# Patient Record
Sex: FEMALE | Race: WHITE | NOT HISPANIC OR LATINO | Employment: FULL TIME | URBAN - METROPOLITAN AREA
[De-identification: names, ages, dates, MRNs, and addresses within clinical notes are randomized per-mention and may not be internally consistent; named-entity substitution may affect disease eponyms.]

---

## 2021-02-10 ENCOUNTER — HOSPITAL ENCOUNTER (EMERGENCY)
Facility: HOSPITAL | Age: 41
Discharge: HOME | End: 2021-02-10
Attending: EMERGENCY MEDICINE
Payer: COMMERCIAL

## 2021-02-10 VITALS
DIASTOLIC BLOOD PRESSURE: 63 MMHG | OXYGEN SATURATION: 99 % | TEMPERATURE: 98.3 F | WEIGHT: 200 LBS | SYSTOLIC BLOOD PRESSURE: 116 MMHG | HEART RATE: 89 BPM | RESPIRATION RATE: 20 BRPM

## 2021-02-10 DIAGNOSIS — F32.A DEPRESSION, UNSPECIFIED DEPRESSION TYPE: Primary | ICD-10-CM

## 2021-02-10 PROCEDURE — 99281 EMR DPT VST MAYX REQ PHY/QHP: CPT

## 2021-02-10 PROCEDURE — 99284 EMERGENCY DEPT VISIT MOD MDM: CPT

## 2021-02-10 RX ORDER — FLUOXETINE HYDROCHLORIDE 60 MG/1
60 TABLET, FILM COATED ORAL; ORAL
COMMUNITY
Start: 2021-01-26 | End: 2023-02-02 | Stop reason: SDUPTHER

## 2021-02-10 RX ORDER — DEXTROAMPHETAMINE SACCHARATE, AMPHETAMINE ASPARTATE MONOHYDRATE, DEXTROAMPHETAMINE SULFATE AND AMPHETAMINE SULFATE 6.25; 6.25; 6.25; 6.25 MG/1; MG/1; MG/1; MG/1
CAPSULE, EXTENDED RELEASE ORAL
COMMUNITY
Start: 2021-01-19 | End: 2023-02-14

## 2021-02-10 RX ORDER — CLONAZEPAM 0.5 MG/1
TABLET ORAL
COMMUNITY
Start: 2021-01-26 | End: 2023-02-14

## 2021-02-10 RX ORDER — CETIRIZINE HYDROCHLORIDE 10 MG/1
10 TABLET ORAL
COMMUNITY
Start: 2021-01-08 | End: 2024-09-27 | Stop reason: ALTCHOICE

## 2021-02-10 RX ORDER — NORETHINDRONE ACETATE AND ETHINYL ESTRADIOL, AND FERROUS FUMARATE 1.5-30(21)
1 KIT ORAL
COMMUNITY
Start: 2021-01-16 | End: 2023-02-14

## 2021-02-11 SDOH — ECONOMIC STABILITY: FOOD INSECURITY: WITHIN THE PAST 12 MONTHS, THE FOOD YOU BOUGHT JUST DIDN'T LAST AND YOU DIDN'T HAVE MONEY TO GET MORE.: NEVER TRUE

## 2021-02-11 SDOH — HEALTH STABILITY: MENTAL HEALTH: HOW OFTEN DO YOU HAVE A DRINK CONTAINING ALCOHOL?: NEVER

## 2021-02-11 SDOH — HEALTH STABILITY: MENTAL HEALTH: HOW OFTEN DO YOU HAVE SIX OR MORE DRINKS ON ONE OCCASION?: NEVER

## 2021-02-11 SDOH — ECONOMIC STABILITY: FOOD INSECURITY: WITHIN THE PAST 12 MONTHS, YOU WORRIED THAT YOUR FOOD WOULD RUN OUT BEFORE YOU GOT THE MONEY TO BUY MORE.: NEVER TRUE

## 2021-02-11 SDOH — ECONOMIC STABILITY: FOOD INSECURITY: HOW HARD IS IT FOR YOU TO PAY FOR THE VERY BASICS LIKE FOOD, HOUSING, MEDICAL CARE, AND HEATING?: NOT HARD AT ALL

## 2021-02-11 SDOH — ECONOMIC STABILITY: TRANSPORTATION INSECURITY: IN THE PAST 12 MONTHS, HAS LACK OF TRANSPORTATION KEPT YOU FROM MEDICAL APPOINTMENTS OR FROM GETTING MEDICATIONS?: NO

## 2021-02-11 ASSESSMENT — COGNITIVE AND FUNCTIONAL STATUS - GENERAL
MOOD: DEPRESSED;HELPLESS
APPEARANCE: WELL GROOMED
JUDGEMENT: IMPAIRED, MINIMALLY
SPEECH: REGULAR
AFFECT: TEARFUL
PSYCHOMOTOR FUNCTIONING: WNL
PERCEPTUAL FUNCTION: NORMAL
THOUGHT_CONTENT: APPROPRIATE
INSIGHT: IMPAIRED, MINIMALLY
IMPULSE CONTROL: NORMAL
ORIENTATION: FULLY ORIENTED

## 2021-02-11 ASSESSMENT — ENCOUNTER SYMPTOMS
HALLUCINATIONS: 0
NUMBNESS: 0
WEAKNESS: 0
NERVOUS/ANXIOUS: 1
COUGH: 0
HEADACHES: 0
VOMITING: 0
NECK STIFFNESS: 0
SHORTNESS OF BREATH: 0
NECK PAIN: 0
FEVER: 0
DIARRHEA: 0
ABDOMINAL PAIN: 0
COLOR CHANGE: 0

## 2021-02-11 ASSESSMENT — ACTIVITIES OF DAILY LIVING (ADL): LACK_OF_TRANSPORTATION: NO
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
## 2021-02-11 NOTE — DISCHARGE INSTRUCTIONS
Follow-up with the resources provided to you by our   Continue taking your Prozac as prescribed    Return to the ER if any increase depression, suicidal thoughts, homicidal thoughts, hallucinations, chest pain, shortness of breath, thoughts of harming yourself, any concerns

## 2023-03-28 ENCOUNTER — APPOINTMENT (RX ONLY)
Dept: URBAN - METROPOLITAN AREA CLINIC 374 | Facility: CLINIC | Age: 43
Setting detail: DERMATOLOGY
End: 2023-03-28

## 2023-03-28 DIAGNOSIS — D22 MELANOCYTIC NEVI: ICD-10-CM

## 2023-03-28 DIAGNOSIS — Z71.89 OTHER SPECIFIED COUNSELING: ICD-10-CM

## 2023-03-28 DIAGNOSIS — D18.0 HEMANGIOMA: ICD-10-CM

## 2023-03-28 DIAGNOSIS — D485 NEOPLASM OF UNCERTAIN BEHAVIOR OF SKIN: ICD-10-CM

## 2023-03-28 DIAGNOSIS — L82.0 INFLAMED SEBORRHEIC KERATOSIS: ICD-10-CM

## 2023-03-28 DIAGNOSIS — L81.4 OTHER MELANIN HYPERPIGMENTATION: ICD-10-CM

## 2023-03-28 PROBLEM — D22.61 MELANOCYTIC NEVI OF RIGHT UPPER LIMB, INCLUDING SHOULDER: Status: ACTIVE | Noted: 2023-03-28

## 2023-03-28 PROBLEM — D48.5 NEOPLASM OF UNCERTAIN BEHAVIOR OF SKIN: Status: ACTIVE | Noted: 2023-03-28

## 2023-03-28 PROBLEM — D22.71 MELANOCYTIC NEVI OF RIGHT LOWER LIMB, INCLUDING HIP: Status: ACTIVE | Noted: 2023-03-28

## 2023-03-28 PROBLEM — D22.5 MELANOCYTIC NEVI OF TRUNK: Status: ACTIVE | Noted: 2023-03-28

## 2023-03-28 PROBLEM — D22.72 MELANOCYTIC NEVI OF LEFT LOWER LIMB, INCLUDING HIP: Status: ACTIVE | Noted: 2023-03-28

## 2023-03-28 PROBLEM — D18.01 HEMANGIOMA OF SKIN AND SUBCUTANEOUS TISSUE: Status: ACTIVE | Noted: 2023-03-28

## 2023-03-28 PROBLEM — D22.62 MELANOCYTIC NEVI OF LEFT UPPER LIMB, INCLUDING SHOULDER: Status: ACTIVE | Noted: 2023-03-28

## 2023-03-28 PROCEDURE — ? COUNSELING

## 2023-03-28 PROCEDURE — ? SUNSCREEN RECOMMENDATIONS

## 2023-03-28 PROCEDURE — ? PHOTO-DOCUMENTATION

## 2023-03-28 PROCEDURE — ? FULL BODY SKIN EXAM

## 2023-03-28 PROCEDURE — 99203 OFFICE O/P NEW LOW 30 MIN: CPT

## 2023-03-28 PROCEDURE — ? DEFER

## 2023-03-28 ASSESSMENT — LOCATION DETAILED DESCRIPTION DERM
LOCATION DETAILED: RIGHT PROXIMAL PRETIBIAL REGION
LOCATION DETAILED: RIGHT PROXIMAL POSTERIOR UPPER ARM
LOCATION DETAILED: RIGHT SUPERIOR MEDIAL UPPER BACK
LOCATION DETAILED: LEFT PROXIMAL PRETIBIAL REGION
LOCATION DETAILED: RIGHT SUPERIOR UPPER BACK
LOCATION DETAILED: EPIGASTRIC SKIN
LOCATION DETAILED: SUPERIOR THORACIC SPINE
LOCATION DETAILED: LEFT MEDIAL SUPERIOR CHEST
LOCATION DETAILED: LEFT PROXIMAL POSTERIOR UPPER ARM

## 2023-03-28 ASSESSMENT — LOCATION SIMPLE DESCRIPTION DERM
LOCATION SIMPLE: RIGHT POSTERIOR UPPER ARM
LOCATION SIMPLE: LEFT POSTERIOR UPPER ARM
LOCATION SIMPLE: UPPER BACK
LOCATION SIMPLE: RIGHT PRETIBIAL REGION
LOCATION SIMPLE: RIGHT UPPER BACK
LOCATION SIMPLE: LEFT PRETIBIAL REGION
LOCATION SIMPLE: ABDOMEN
LOCATION SIMPLE: CHEST

## 2023-03-28 ASSESSMENT — LOCATION ZONE DERM
LOCATION ZONE: ARM
LOCATION ZONE: LEG
LOCATION ZONE: TRUNK

## 2023-03-28 NOTE — PROCEDURE: DEFER
Procedure To Be Performed At Next Visit: Biopsy by shave method
Size Of Lesion In Cm (Optional): 0
Detail Level: Zone
Introduction Text (Please End With A Colon): The following was deferred:
Procedure To Be Performed At Next Visit: Shave Removal

## 2023-05-11 ENCOUNTER — APPOINTMENT (RX ONLY)
Dept: URBAN - METROPOLITAN AREA CLINIC 374 | Facility: CLINIC | Age: 43
Setting detail: DERMATOLOGY
End: 2023-05-11

## 2023-05-11 DIAGNOSIS — D485 NEOPLASM OF UNCERTAIN BEHAVIOR OF SKIN: ICD-10-CM

## 2023-05-11 DIAGNOSIS — L82.0 INFLAMED SEBORRHEIC KERATOSIS: ICD-10-CM

## 2023-05-11 PROBLEM — D48.5 NEOPLASM OF UNCERTAIN BEHAVIOR OF SKIN: Status: ACTIVE | Noted: 2023-05-11

## 2023-05-11 PROCEDURE — ? SHAVE REMOVAL

## 2023-05-11 PROCEDURE — 11300 SHAVE SKIN LESION 0.5 CM/<: CPT

## 2023-05-11 PROCEDURE — ? PHOTO-DOCUMENTATION

## 2023-05-11 PROCEDURE — 11301 SHAVE SKIN LESION 0.6-1.0 CM: CPT

## 2023-05-11 ASSESSMENT — LOCATION DETAILED DESCRIPTION DERM
LOCATION DETAILED: RIGHT SUPERIOR UPPER BACK
LOCATION DETAILED: LEFT MEDIAL SUPERIOR CHEST

## 2023-05-11 ASSESSMENT — LOCATION SIMPLE DESCRIPTION DERM
LOCATION SIMPLE: CHEST
LOCATION SIMPLE: RIGHT UPPER BACK

## 2023-05-11 ASSESSMENT — LOCATION ZONE DERM: LOCATION ZONE: TRUNK

## 2023-05-11 NOTE — PROCEDURE: SHAVE REMOVAL
Medical Necessity Information: It is in your best interest to select a reason for this procedure from the list below. All of these items fulfill various CMS LCD requirements except the new and changing color options.
Medical Necessity Clause: This procedure was medically necessary because the lesion that was treated was:
Lab: 6
Lab Facility: 0
Detail Level: Detailed
Was A Bandage Applied: Yes
Size Of Lesion In Cm (Required): 0.7
Depth Of Shave: dermis
Biopsy Method: Dermablade
Anesthesia Type: 1% lidocaine with epinephrine
Hemostasis: Aluminum Chloride and Electrocautery
Wound Care: Vaseline
Path Notes (To The Dermatopathologist): Please checks margins
Render Path Notes In Note?: No
Consent was obtained from the patient. The risks and benefits to therapy were discussed in detail. Specifically, the risks of infection, scarring, bleeding, prolonged wound healing, incomplete removal, allergy to anesthesia, nerve injury and recurrence were addressed. Prior to the procedure, the treatment site was clearly identified and confirmed by the patient. All components of Universal Protocol/PAUSE Rule completed.
Post-Care Instructions: I reviewed with the patient in detail post-care instructions. Patient is to keep the biopsy site dry overnight, and then apply bacitracin twice daily until healed. Patient may apply hydrogen peroxide soaks to remove any crusting.
Notification Instructions: Patient will be notified of pathology results. However, patient instructed to call the office if not contacted within 2 weeks.
Billing Type: Third-Party Bill
Size Of Lesion In Cm (Required): 0.3

## 2024-04-18 ENCOUNTER — APPOINTMENT (RX ONLY)
Dept: URBAN - METROPOLITAN AREA CLINIC 374 | Facility: CLINIC | Age: 44
Setting detail: DERMATOLOGY
End: 2024-04-18

## 2024-04-18 DIAGNOSIS — B07.8 OTHER VIRAL WARTS: ICD-10-CM | Status: INADEQUATELY CONTROLLED

## 2024-04-18 DIAGNOSIS — D22 MELANOCYTIC NEVI: ICD-10-CM

## 2024-04-18 DIAGNOSIS — L82.1 OTHER SEBORRHEIC KERATOSIS: ICD-10-CM

## 2024-04-18 DIAGNOSIS — D18.0 HEMANGIOMA: ICD-10-CM

## 2024-04-18 DIAGNOSIS — D485 NEOPLASM OF UNCERTAIN BEHAVIOR OF SKIN: ICD-10-CM

## 2024-04-18 DIAGNOSIS — L81.4 OTHER MELANIN HYPERPIGMENTATION: ICD-10-CM

## 2024-04-18 DIAGNOSIS — Z71.89 OTHER SPECIFIED COUNSELING: ICD-10-CM

## 2024-04-18 PROBLEM — D22.5 MELANOCYTIC NEVI OF TRUNK: Status: ACTIVE | Noted: 2024-04-18

## 2024-04-18 PROBLEM — D18.01 HEMANGIOMA OF SKIN AND SUBCUTANEOUS TISSUE: Status: ACTIVE | Noted: 2024-04-18

## 2024-04-18 PROBLEM — D23.72 OTHER BENIGN NEOPLASM OF SKIN OF LEFT LOWER LIMB, INCLUDING HIP: Status: ACTIVE | Noted: 2024-04-18

## 2024-04-18 PROBLEM — D48.5 NEOPLASM OF UNCERTAIN BEHAVIOR OF SKIN: Status: ACTIVE | Noted: 2024-04-18

## 2024-04-18 PROCEDURE — ? FULL BODY SKIN EXAM

## 2024-04-18 PROCEDURE — ? TREATMENT REGIMEN

## 2024-04-18 PROCEDURE — 99213 OFFICE O/P EST LOW 20 MIN: CPT

## 2024-04-18 PROCEDURE — ? DEFER

## 2024-04-18 PROCEDURE — ? PHOTO-DOCUMENTATION

## 2024-04-18 PROCEDURE — ? CPT CODE GENERATOR

## 2024-04-18 PROCEDURE — ? COUNSELING

## 2024-04-18 ASSESSMENT — LOCATION DETAILED DESCRIPTION DERM
LOCATION DETAILED: RIGHT INFERIOR MEDIAL UPPER BACK
LOCATION DETAILED: LEFT PROXIMAL POSTERIOR UPPER ARM
LOCATION DETAILED: LEFT ANTERIOR MEDIAL PROXIMAL THIGH
LOCATION DETAILED: UPPER STERNUM
LOCATION DETAILED: RIGHT ANTERIOR SHOULDER
LOCATION DETAILED: RIGHT INFERIOR LATERAL MIDBACK
LOCATION DETAILED: EPIGASTRIC SKIN
LOCATION DETAILED: LEFT CENTRAL MALAR CHEEK
LOCATION DETAILED: INFERIOR THORACIC SPINE
LOCATION DETAILED: RIGHT MEDIAL FOREHEAD

## 2024-04-18 ASSESSMENT — LOCATION SIMPLE DESCRIPTION DERM
LOCATION SIMPLE: RIGHT SHOULDER
LOCATION SIMPLE: LEFT CHEEK
LOCATION SIMPLE: RIGHT LOWER BACK
LOCATION SIMPLE: RIGHT FOREHEAD
LOCATION SIMPLE: UPPER BACK
LOCATION SIMPLE: LEFT POSTERIOR UPPER ARM
LOCATION SIMPLE: CHEST
LOCATION SIMPLE: ABDOMEN
LOCATION SIMPLE: LEFT THIGH
LOCATION SIMPLE: RIGHT UPPER BACK

## 2024-04-18 ASSESSMENT — LOCATION ZONE DERM
LOCATION ZONE: TRUNK
LOCATION ZONE: FACE
LOCATION ZONE: ARM
LOCATION ZONE: LEG

## 2024-04-18 NOTE — PROCEDURE: DEFER
X Size Of Lesion In Cm (Optional): 0.3
Procedure To Be Performed At Next Visit: Biopsy by shave method
Introduction Text (Please End With A Colon): The following procedure was deferred:
Detail Level: Detailed
Size Of Lesion In Cm (Optional): 0.4
X Size Of Lesion In Cm (Optional): 0.2
X Size Of Lesion In Cm (Optional): 0
Procedure To Be Performed At Next Visit: Liquid nitrogen

## 2024-04-18 NOTE — PROCEDURE: CPT CODE GENERATOR
29613 Price: 0.00
Show Pathology Codes?: No
First Procedure You Would Like A Quote For?: Benign Destruction
Anticipated Depth And Size Of Soft Tissue Excision (Required): Subcutaneous, Less than 2 cm
First Biopsy Type: Tangential Biopsy
How Many Lesions Are You Destroying?: 1
Number Of Lesions Injected: Less than 8 lesions
Anticipated Depth And Size Of Soft Tissue Excision (Required): Subcutaneous, Less than 1.5 cm
Fee Schedule Discount In % Off Of Fee Schedule: Standard Fee Schedule as Entered in Pricing Tab
How Many Lesions Are You Destroying?: Less than 15
Fee Schedule Discount For Cash Pay Patients In % Off Of Fee Schedule: 0
Anticipated Depth And Size Of Soft Tissue Excision (Required): Subcutaneous, Less than 3 cm
Which Photosensitizer Was Used?: Levulan
Detail Level: None

## 2024-04-25 ENCOUNTER — APPOINTMENT (RX ONLY)
Dept: URBAN - METROPOLITAN AREA CLINIC 374 | Facility: CLINIC | Age: 44
Setting detail: DERMATOLOGY
End: 2024-04-25

## 2024-04-25 DIAGNOSIS — B07.8 OTHER VIRAL WARTS: ICD-10-CM | Status: INADEQUATELY CONTROLLED

## 2024-04-25 DIAGNOSIS — D485 NEOPLASM OF UNCERTAIN BEHAVIOR OF SKIN: ICD-10-CM

## 2024-04-25 PROBLEM — D48.5 NEOPLASM OF UNCERTAIN BEHAVIOR OF SKIN: Status: ACTIVE | Noted: 2024-04-25

## 2024-04-25 PROCEDURE — ? COUNSELING

## 2024-04-25 PROCEDURE — ? PHOTO-DOCUMENTATION

## 2024-04-25 PROCEDURE — ? LIQUID NITROGEN

## 2024-04-25 PROCEDURE — 11103 TANGNTL BX SKIN EA SEP/ADDL: CPT | Mod: 59

## 2024-04-25 PROCEDURE — 11102 TANGNTL BX SKIN SINGLE LES: CPT | Mod: 59

## 2024-04-25 PROCEDURE — 17110 DESTRUCTION B9 LES UP TO 14: CPT

## 2024-04-25 PROCEDURE — ? BIOPSY BY SHAVE METHOD

## 2024-04-25 ASSESSMENT — LOCATION SIMPLE DESCRIPTION DERM
LOCATION SIMPLE: LEFT THIGH
LOCATION SIMPLE: LEFT POSTERIOR UPPER ARM
LOCATION SIMPLE: RIGHT LOWER BACK

## 2024-04-25 ASSESSMENT — LOCATION ZONE DERM
LOCATION ZONE: ARM
LOCATION ZONE: LEG
LOCATION ZONE: TRUNK

## 2024-04-25 ASSESSMENT — LOCATION DETAILED DESCRIPTION DERM
LOCATION DETAILED: LEFT ANTERIOR PROXIMAL THIGH
LOCATION DETAILED: LEFT PROXIMAL POSTERIOR UPPER ARM
LOCATION DETAILED: RIGHT INFERIOR LATERAL MIDBACK

## 2024-04-25 NOTE — PROCEDURE: BIOPSY BY SHAVE METHOD
Detail Level: Detailed
Depth Of Biopsy: dermis
Was A Bandage Applied: Yes
Size Of Lesion In Cm: 0.4
X Size Of Lesion In Cm: 0.3
Biopsy Type: H and E
Biopsy Method: Dermablade
Anesthesia Type: 1% lidocaine with epinephrine
Anesthesia Volume In Cc: 0.2
Additional Anesthesia Volume In Cc (Will Not Render If 0): 0
Hemostasis: Drysol
Wound Care: Petrolatum
Dressing: bandage
Destruction After The Procedure: No
Type Of Destruction Used: Curettage
Curettage Text: The wound bed was treated with curettage after the biopsy was performed.
Cryotherapy Text: The wound bed was treated with cryotherapy after the biopsy was performed.
Electrodesiccation Text: The wound bed was treated with electrodesiccation after the biopsy was performed.
Electrodesiccation And Curettage Text: The wound bed was treated with electrodesiccation and curettage after the biopsy was performed.
Silver Nitrate Text: The wound bed was treated with silver nitrate after the biopsy was performed.
Lab: 6
Lab Facility: 3
Consent: Written consent was obtained and risks were reviewed including but not limited to scarring, infection, bleeding, scabbing, incomplete removal, nerve damage and allergy to anesthesia.
Post-Care Instructions: I reviewed with the patient in detail post-care instructions. Patient is to keep the biopsy site dry overnight, and then apply bacitracin twice daily until healed. Patient may apply hydrogen peroxide soaks to remove any crusting.
Notification Instructions: Patient will be notified of biopsy results. However, patient instructed to call the office if not contacted within 2 weeks.
Billing Type: Third-Party Bill
Information: Selecting Yes will display possible errors in your note based on the variables you have selected. This validation is only offered as a suggestion for you. PLEASE NOTE THAT THE VALIDATION TEXT WILL BE REMOVED WHEN YOU FINALIZE YOUR NOTE. IF YOU WANT TO FAX A PRELIMINARY NOTE YOU WILL NEED TO TOGGLE THIS TO 'NO' IF YOU DO NOT WANT IT IN YOUR FAXED NOTE.

## 2024-04-25 NOTE — PROCEDURE: LIQUID NITROGEN
Show Topical Anesthesia Variable?: Yes
Include Z78.9 (Other Specified Conditions Influencing Health Status) As An Associated Diagnosis?: No
Detail Level: Detailed
Consent: The patient's consent was obtained including but not limited to risks of crusting, scabbing, blistering, scarring, darker or lighter pigmentary change, recurrence, incomplete removal and infection.
Medical Necessity Clause: This procedure was medically necessary because the lesions that were treated were:
Medical Necessity Information: It is in your best interest to select a reason for this procedure from the list below. All of these items fulfill various CMS LCD requirements except the new and changing color options.
Number Of Freeze-Thaw Cycles: 3 freeze-thaw cycles
Duration Of Freeze Thaw-Cycle (Seconds): 3
Post-Care Instructions: I reviewed with the patient in detail post-care instructions. Patient is to wear sunprotection, and avoid picking at any of the treated lesions. Pt may apply Vaseline to crusted or scabbing areas.
Spray Paint Text: The liquid nitrogen was applied to the skin utilizing a spray paint frosting technique.

## 2024-09-26 ENCOUNTER — OFFICE VISIT (OUTPATIENT)
Dept: SURGERY | Facility: CLINIC | Age: 44
End: 2024-09-26
Payer: COMMERCIAL

## 2024-09-26 ENCOUNTER — HOSPITAL ENCOUNTER (OUTPATIENT)
Dept: CARDIOLOGY | Facility: HOSPITAL | Age: 44
Discharge: HOME | End: 2024-09-26
Attending: SURGERY
Payer: COMMERCIAL

## 2024-09-26 ENCOUNTER — TRANSCRIBE ORDERS (OUTPATIENT)
Dept: SURGERY | Facility: CLINIC | Age: 44
End: 2024-09-26

## 2024-09-26 ENCOUNTER — PREP FOR CASE (OUTPATIENT)
Dept: SURGERY | Facility: CLINIC | Age: 44
End: 2024-09-26

## 2024-09-26 ENCOUNTER — APPOINTMENT (OUTPATIENT)
Dept: LAB | Facility: HOSPITAL | Age: 44
End: 2024-09-26
Attending: SURGERY
Payer: COMMERCIAL

## 2024-09-26 VITALS
OXYGEN SATURATION: 98 % | BODY MASS INDEX: 29.1 KG/M2 | SYSTOLIC BLOOD PRESSURE: 116 MMHG | WEIGHT: 185.4 LBS | TEMPERATURE: 98 F | DIASTOLIC BLOOD PRESSURE: 72 MMHG | HEART RATE: 61 BPM | HEIGHT: 67 IN

## 2024-09-26 DIAGNOSIS — K43.9 VENTRAL HERNIA WITHOUT OBSTRUCTION OR GANGRENE: Primary | ICD-10-CM

## 2024-09-26 DIAGNOSIS — Z01.812 ENCOUNTER FOR PREPROCEDURAL LABORATORY EXAMINATION: Primary | ICD-10-CM

## 2024-09-26 DIAGNOSIS — Z01.812 ENCOUNTER FOR PREPROCEDURAL LABORATORY EXAMINATION: ICD-10-CM

## 2024-09-26 PROBLEM — K43.6 INCARCERATED VENTRAL HERNIA: Status: ACTIVE | Noted: 2024-09-26

## 2024-09-26 LAB
ANION GAP SERPL CALC-SCNC: 5 MEQ/L (ref 3–15)
BASOPHILS # BLD: 0.03 K/UL (ref 0.01–0.1)
BASOPHILS NFR BLD: 0.4 %
BUN SERPL-MCNC: 10 MG/DL (ref 7–25)
CALCIUM SERPL-MCNC: 9.5 MG/DL (ref 8.6–10.3)
CHLORIDE SERPL-SCNC: 101 MEQ/L (ref 98–107)
CO2 SERPL-SCNC: 34 MEQ/L (ref 21–31)
CREAT SERPL-MCNC: 0.9 MG/DL (ref 0.6–1.2)
DIFFERENTIAL METHOD BLD: NORMAL
EGFRCR SERPLBLD CKD-EPI 2021: >60 ML/MIN/1.73M*2
EOSINOPHIL # BLD: 0.16 K/UL (ref 0.04–0.36)
EOSINOPHIL NFR BLD: 2.4 %
ERYTHROCYTE [DISTWIDTH] IN BLOOD BY AUTOMATED COUNT: 12.7 % (ref 11.7–14.4)
GLUCOSE SERPL-MCNC: 94 MG/DL (ref 70–99)
HCT VFR BLD AUTO: 41.1 % (ref 35–45)
HGB BLD-MCNC: 13.3 G/DL (ref 11.8–15.7)
IMM GRANULOCYTES # BLD AUTO: 0.01 K/UL (ref 0–0.08)
IMM GRANULOCYTES NFR BLD AUTO: 0.1 %
LYMPHOCYTES # BLD: 2.09 K/UL (ref 1.2–3.5)
LYMPHOCYTES NFR BLD: 31.2 %
MCH RBC QN AUTO: 28.9 PG (ref 28–33.2)
MCHC RBC AUTO-ENTMCNC: 32.4 G/DL (ref 32.2–35.5)
MCV RBC AUTO: 89.2 FL (ref 83–98)
MONOCYTES # BLD: 0.65 K/UL (ref 0.28–0.8)
MONOCYTES NFR BLD: 9.7 %
NEUTROPHILS # BLD: 3.76 K/UL (ref 1.7–7)
NEUTS SEG NFR BLD: 56.2 %
NRBC BLD-RTO: 0 %
PDW BLD AUTO: 11.1 FL (ref 9.4–12.3)
PLATELET # BLD AUTO: 286 K/UL (ref 150–369)
POTASSIUM SERPL-SCNC: 5.1 MEQ/L (ref 3.5–5.1)
RBC # BLD AUTO: 4.61 M/UL (ref 3.93–5.22)
SODIUM SERPL-SCNC: 140 MEQ/L (ref 136–145)
WBC # BLD AUTO: 6.7 K/UL (ref 3.8–10.5)

## 2024-09-26 PROCEDURE — 80048 BASIC METABOLIC PNL TOTAL CA: CPT

## 2024-09-26 PROCEDURE — 85025 COMPLETE CBC W/AUTO DIFF WBC: CPT

## 2024-09-26 PROCEDURE — 36415 COLL VENOUS BLD VENIPUNCTURE: CPT

## 2024-09-26 PROCEDURE — 3008F BODY MASS INDEX DOCD: CPT | Performed by: SURGERY

## 2024-09-26 PROCEDURE — 93005 ELECTROCARDIOGRAM TRACING: CPT

## 2024-09-26 PROCEDURE — 99204 OFFICE O/P NEW MOD 45 MIN: CPT | Performed by: SURGERY

## 2024-09-26 RX ORDER — MINERAL OIL
180 ENEMA (ML) RECTAL EVERY MORNING
COMMUNITY

## 2024-09-26 NOTE — PROGRESS NOTES
[facility]    Dr. Sandor Chung   General Surgery Consult       Patient: Kymberly Grajeda    1980    Visit Date: 09/26/2024    Encounter Provider: Sheldon Patten    Referring Provider: Scheier, Jacqueline, DO    Reason for Visit:  New Patient (Pt is here for a umbilical hernia evaluation. Pt had a previous Ctscan done on 9/14/2021. Pt has a pulling feeling when she moves certain ways. )      Kymberly Grajeda is a 44 y.o. female who was seen for a consultation at the request of referring physician for evaluation of a ventral abdominal wall hernia. Started with symptoms a few years ago and was referred for evaluation due to increasing pain and swelling. CT had in 2021 and showed increased in size since the last CT, since then feels it is getting bigger and now she can not push it back the same way she used to.     Past Medical History:   Diagnosis Date    ADD (attention deficit disorder)     Anxiety     Depression        No past surgical history on file.    Social History     Tobacco Use    Smoking status: Every Day     Current packs/day: 1.00     Types: Cigarettes    Smokeless tobacco: Never   Vaping Use    Vaping status: Never Used   Substance Use Topics    Alcohol use: Never    Drug use: Not Currently     Comment: using more adderall and klonopin than rx       Family History   Problem Relation Name Age of Onset    No Known Problems Biological Mother      Heart attack Biological Father      No Known Problems Biological Sister      No Known Problems Biological Sister         Patient has no known allergies.    Current Medications:  Current Outpatient Medications   Medication Instructions    buPROPion XL (WELLBUTRIN XL) 300 mg, oral, Daily    cetirizine (ZYRTEC) 10 mg, Daily (8a)    fexofenadine (ALLEGRA) 180 mg, Daily    FLUoxetine (PROZAC) 20 mg, oral, Daily    FLUoxetine (PROZAC) 20 mg, oral, Daily    furosemide (LASIX) 40 mg tablet   See Instructions, TAKE 1  TABLET BY MOUTH EVERY DAY, # 90 tab, 1 Refill(s), Prescription Routing: Route to Pharmacy Electronically, Pharmacy: [id number], 171, 03/25/22 9:22:00 EDT, Height, cm, 90.9, 01/01/22 18:39:00 EST, Dosing Weight, kg       Review of Systems    General: In no acute distress  Hematological and Lymphatic ROS: negative, no lymphadenopathy  Respiratory ROS: no cough, shortness of breath, or wheezing  Gastrointestinal ROS: (+) ventral hernia and swelling w pain, no change in bowel habits, or black or bloody stools, no jaundice  Musculoskeletal ROS: negative for - joint pain, joint stiffness, joint swelling, or muscle pain  Neurological ROS: negative for - behavioral changes, headaches, memory loss, or seizures  Extremities: No swelling  Head and Neck: No difficulty swallowing or lymphadenopathy      Physical Exam    General: In no acute distress  Head and Neck: No lymphadenopathy, thyroid normal  Respiratory: Lungs clear to auscultation bilaterally, no rhonchi   Gastroenterology: (+) non reducible ventral hernia no skin changes    Extremities: warm no swelling or erythema  Heart: No murmurs, RRR  Endocrine: No groin or axillary lymphadenopathy  Musculoskeletal: Normal sensation all extremities, no motor dysfunction  Back: Negative for TTP or mass       A/Plan:     Today I had a john discussion with the patient about the meaning of the diagnosis and available treatment options.    The patient suffers from an incarcerated ventral hernia and based on the symptomatic nature of the hernia and incarcerated nature I do recommend to proceed with a robotic possible laparoscopic possible open repair.  We discussed the risk and benefits of procedure which include but not limited to bleeding infection seroma need for additional procedures failure of symptoms to resolve chronic pain and the need for additional surgeries or even bowel injury.  The patient is very grateful and agrees to proceed.  All questions were  answered.    Sheldon Patten MD, FACS  General and Endocrine Surgery  [facility]       09/26/2024

## 2024-09-26 NOTE — H&P (VIEW-ONLY)
[facility]    Dr. Sandor Chung   General Surgery Consult       Patient: Kymberly Grajeda    1980    Visit Date: 09/26/2024    Encounter Provider: Sheldon Patten    Referring Provider: Scheier, Jacqueline, DO    Reason for Visit:  New Patient (Pt is here for a umbilical hernia evaluation. Pt had a previous Ctscan done on 9/14/2021. Pt has a pulling feeling when she moves certain ways. )      Kymberly Grajeda is a 44 y.o. female who was seen for a consultation at the request of referring physician for evaluation of a ventral abdominal wall hernia. Started with symptoms a few years ago and was referred for evaluation due to increasing pain and swelling. CT had in 2021 and showed increased in size since the last CT, since then feels it is getting bigger and now she can not push it back the same way she used to.     Past Medical History:   Diagnosis Date    ADD (attention deficit disorder)     Anxiety     Depression        No past surgical history on file.    Social History     Tobacco Use    Smoking status: Every Day     Current packs/day: 1.00     Types: Cigarettes    Smokeless tobacco: Never   Vaping Use    Vaping status: Never Used   Substance Use Topics    Alcohol use: Never    Drug use: Not Currently     Comment: using more adderall and klonopin than rx       Family History   Problem Relation Name Age of Onset    No Known Problems Biological Mother      Heart attack Biological Father      No Known Problems Biological Sister      No Known Problems Biological Sister         Patient has no known allergies.    Current Medications:  Current Outpatient Medications   Medication Instructions    buPROPion XL (WELLBUTRIN XL) 300 mg, oral, Daily    cetirizine (ZYRTEC) 10 mg, Daily (8a)    fexofenadine (ALLEGRA) 180 mg, Daily    FLUoxetine (PROZAC) 20 mg, oral, Daily    FLUoxetine (PROZAC) 20 mg, oral, Daily    furosemide (LASIX) 40 mg tablet   See Instructions, TAKE 1  TABLET BY MOUTH EVERY DAY, # 90 tab, 1 Refill(s), Prescription Routing: Route to Pharmacy Electronically, Pharmacy: [id number], 171, 03/25/22 9:22:00 EDT, Height, cm, 90.9, 01/01/22 18:39:00 EST, Dosing Weight, kg       Review of Systems    General: In no acute distress  Hematological and Lymphatic ROS: negative, no lymphadenopathy  Respiratory ROS: no cough, shortness of breath, or wheezing  Gastrointestinal ROS: (+) ventral hernia and swelling w pain, no change in bowel habits, or black or bloody stools, no jaundice  Musculoskeletal ROS: negative for - joint pain, joint stiffness, joint swelling, or muscle pain  Neurological ROS: negative for - behavioral changes, headaches, memory loss, or seizures  Extremities: No swelling  Head and Neck: No difficulty swallowing or lymphadenopathy      Physical Exam    General: In no acute distress  Head and Neck: No lymphadenopathy, thyroid normal  Respiratory: Lungs clear to auscultation bilaterally, no rhonchi   Gastroenterology: (+) non reducible ventral hernia no skin changes    Extremities: warm no swelling or erythema  Heart: No murmurs, RRR  Endocrine: No groin or axillary lymphadenopathy  Musculoskeletal: Normal sensation all extremities, no motor dysfunction  Back: Negative for TTP or mass       A/Plan:     Today I had a john discussion with the patient about the meaning of the diagnosis and available treatment options.    The patient suffers from an incarcerated ventral hernia and based on the symptomatic nature of the hernia and incarcerated nature I do recommend to proceed with a robotic possible laparoscopic possible open repair.  We discussed the risk and benefits of procedure which include but not limited to bleeding infection seroma need for additional procedures failure of symptoms to resolve chronic pain and the need for additional surgeries or even bowel injury.  The patient is very grateful and agrees to proceed.  All questions were  answered.    Sheldon Patten MD, FACS  General and Endocrine Surgery  [facility]       09/26/2024

## 2024-09-27 ENCOUNTER — PRE-ADMISSION TESTING (OUTPATIENT)
Dept: PREADMISSION TESTING | Age: 44
End: 2024-09-27
Payer: COMMERCIAL

## 2024-09-27 VITALS — WEIGHT: 185 LBS | BODY MASS INDEX: 29.03 KG/M2 | HEIGHT: 67 IN

## 2024-09-27 LAB
ATRIAL RATE: 65
P AXIS: 4
PR INTERVAL: 144
QRS DURATION: 100
QT INTERVAL: 430
QTC CALCULATION(BAZETT): 447
R AXIS: 63
T WAVE AXIS: 53
VENTRICULAR RATE: 65

## 2024-09-27 RX ORDER — GABAPENTIN 600 MG/1
600 TABLET ORAL AS NEEDED
COMMUNITY
Start: 2023-02-21

## 2024-09-27 ASSESSMENT — PAIN SCALES - GENERAL: PAINLEVEL_OUTOF10: 0-NO PAIN

## 2024-09-27 NOTE — PRE-PROCEDURE INSTRUCTIONS
[address] [address]    1.       You will be called between 1pm-5pm one business day before your procedure to tell you where and when to report. If you do not receive a phone call by 6pm, please call the Operating Room at [phone].    2.        Please report to Surgery Registration on the day of your procedure. You can park in the east garage, enter the front doors of the new Pavilion, and take the [place] elevators to the second floor. Follow signs to Surgery Registration.       3. Please follow the following fasting guidelines:     No solid food EIGHT HOURS prior to arrival time on day of surgery.  6 ounces of clear liquids, meaning water or PLAIN black coffee WITHOUT any milk, cream, sugar, or sweetener are permitted up to TWO HOURS prior to arrival at the hospital.    4. Please take ONLY the following medications with a sip of water on the morning of your procedure: Fluoxetine,allegra,bupropion,    5. Other Instructions: You may brush your teeth the morning of the procedure. Rinse and spit, do not swallow.  Bring a list of your medications with dosages.  Use surgical wash as directed.     6. If you develop a cold, cough, fever, rash, or other symptom prior to the day of the procedure, please report it to your physician immediately.    7. If you need to cancel the procedure for any reason, please contact your physician.    8. Make arrangements to have safe transportation home accompanied by a responsible adult. If you have not arranged safe transportation home, your surgery will be cancelled. Safe transportation may include private vehicle, ride-share service, taxi and public transportation when accompanied by a responsible adult who will assist you home. A responsible adult is someone known to you and does not include the taxi, ride-share or public transit drive transporting you.    9.  If it is medically necessary for you to have a longer stay, you will be informed as  soon as the decision is made.    10. Only bring essential items to the hospital.  Do not wear or bring anything of value to the hospital including jewelry of any kind, money, or wallet. Do not wear make-up or contact lenses.  DO NOT BRING MEDICATIONS FROM HOME unless instructed to do so. DO bring your hearing aids, glasses, and a case    11. No lotion, creams, powders, or oils on skin the morning of procedure     12. Dress in comfortable clothes.    13.  If instructed, please bring a copy of your Advanced Directive (Living Will/Durable Power of ) on the day of your procedure.     14. Ensuring your safety at all times is a very important part of our [facility] Culture of Safety. After having surgery and sedation, you are at risk for falling and balance issues. Although you may feel awake, the effects of the medication can last up to 24 hours after anesthesia. If you need to use the bathroom during your recovery period, nursing staff will escort you there and stay with you to ensure your safety.    15. Refrain from drinking alcohol and smoking cigarettes for 24 hours prior to surgery.    16. Shower with antibacterial soap (Dial) the night before and morning of your procedure.  If your procedure indicates the need for CHG antiseptic wash (Bactoshield or Hibiclens), please use this instead and follow instructions as discussed at the time of your Pre-Admission Testing phone interview or visit.  [facility]  Patient Education Preoperative Showers    Good hygiene, such as frequent handwashing and daily skin cleansing, promotes good health. Daily skin cleansing helps remove germs that may cause infections. The following instructions should be followed to help reduce germs on your skin prior to your surgical procedure.     Bactoshield/Hibiclens CHG 4% is an antiseptic soap. The active ingredient is chlorhexidine gluconate. Do not use this product if you are allergic to chlorhexidine gluconate.  The NIGHT before and the  MORNING of your procedure, shower or bathe with Bactoshield. This product should replace your regular soap used for cleansing most of your body surfaces. Bactoshield should not be used on your head or face; keep out of your eyes, ears and mouth.  If you plan to wash your hair, do so with your regular shampoo. Then, rinse the hair and your body thoroughly to remove any shampoo residue.  Wash your face with regular soap and water only.  Wash your genital area with soap and water only.  Thoroughly rinse your body with warm water from the neck down.  Apply the minimum amount of Bactoshield to cover the skin. Use this product as you would any liquid soap. Leave this on for 2 minutes. NOTE- Bactoshield DOES NOT LATHER like normal soap.   Wash the skin gently and rinse thoroughly with warm water. You do not need to scrub the skin to remove germs.  Do not use regular soap after you have applied and rinsed the Bactoshield.  Change into clean clothes after each shower.   Do not apply any lotions, powders, or perfumes to the body areas that have been cleansed with Bactoshield.  No use of hair removal products or shaving at or near the surgical site 48 hours before your procedure. (72 hours for cardiac patients.)  For those having perineal surgeries (i.e.: vaginal, rectal or cystoscopy) - please use Dial soap.  Above instructions reviewed with patient and patient acknowledges understanding.    Form explained by: Zaina Loera RN

## 2024-09-30 ENCOUNTER — HOSPITAL ENCOUNTER (OUTPATIENT)
Facility: HOSPITAL | Age: 44
Setting detail: HOSPITAL OUTPATIENT SURGERY
Discharge: HOME | End: 2024-09-30
Attending: SURGERY | Admitting: SURGERY
Payer: COMMERCIAL

## 2024-09-30 ENCOUNTER — ANESTHESIA EVENT (OUTPATIENT)
Dept: OPERATING ROOM | Facility: HOSPITAL | Age: 44
Setting detail: HOSPITAL OUTPATIENT SURGERY
End: 2024-09-30
Payer: COMMERCIAL

## 2024-09-30 VITALS
HEART RATE: 69 BPM | WEIGHT: 185 LBS | HEIGHT: 67 IN | SYSTOLIC BLOOD PRESSURE: 135 MMHG | DIASTOLIC BLOOD PRESSURE: 64 MMHG | RESPIRATION RATE: 20 BRPM | OXYGEN SATURATION: 97 % | TEMPERATURE: 98.1 F | BODY MASS INDEX: 29.03 KG/M2

## 2024-09-30 DIAGNOSIS — K43.9 VENTRAL HERNIA WITHOUT OBSTRUCTION OR GANGRENE: ICD-10-CM

## 2024-09-30 LAB
B-HCG UR QL: NEGATIVE
POCT TEST: NORMAL

## 2024-09-30 PROCEDURE — 71000012 HC PACU PHASE 2 EA ADDL MIN: Performed by: SURGERY

## 2024-09-30 PROCEDURE — 63600000 HC DRUGS/DETAIL CODE: Mod: JZ | Performed by: STUDENT IN AN ORGANIZED HEALTH CARE EDUCATION/TRAINING PROGRAM

## 2024-09-30 PROCEDURE — 71000002 HC PACU PHASE 2 INITIAL 30MIN: Performed by: SURGERY

## 2024-09-30 PROCEDURE — 0WUF4JZ SUPPLEMENT ABDOMINAL WALL WITH SYNTHETIC SUBSTITUTE, PERCUTANEOUS ENDOSCOPIC APPROACH: ICD-10-PCS | Performed by: SURGERY

## 2024-09-30 PROCEDURE — 63600000 HC DRUGS/DETAIL CODE: Mod: JZ

## 2024-09-30 PROCEDURE — 25000000 HC PHARMACY GENERAL: Performed by: NURSE ANESTHETIST, CERTIFIED REGISTERED

## 2024-09-30 PROCEDURE — 27200000 HC STERILE SUPPLY: Performed by: SURGERY

## 2024-09-30 PROCEDURE — 36000006 HC OR LEVEL 6 INITIAL 30MIN: Performed by: SURGERY

## 2024-09-30 PROCEDURE — 88302 TISSUE EXAM BY PATHOLOGIST: CPT | Performed by: SURGERY

## 2024-09-30 PROCEDURE — S2900 ROBOTIC SURGICAL SYSTEM: HCPCS | Performed by: SURGERY

## 2024-09-30 PROCEDURE — 63600000 HC DRUGS/DETAIL CODE: Mod: JW | Performed by: NURSE ANESTHETIST, CERTIFIED REGISTERED

## 2024-09-30 PROCEDURE — 49592 RPR AA HRN 1ST < 3 NCR/STRN: CPT | Performed by: SURGERY

## 2024-09-30 PROCEDURE — 63600000 HC DRUGS/DETAIL CODE: Mod: JZ | Performed by: SURGERY

## 2024-09-30 PROCEDURE — 37000001 HC ANESTHESIA GENERAL: Performed by: SURGERY

## 2024-09-30 PROCEDURE — C1781 MESH (IMPLANTABLE): HCPCS | Performed by: SURGERY

## 2024-09-30 PROCEDURE — 71000011 HC PACU PHASE 1 EA ADDL MIN: Performed by: SURGERY

## 2024-09-30 PROCEDURE — 71000001 HC PACU PHASE 1 INITIAL 30MIN: Performed by: SURGERY

## 2024-09-30 PROCEDURE — 63700000 HC SELF-ADMINISTRABLE DRUG: Performed by: STUDENT IN AN ORGANIZED HEALTH CARE EDUCATION/TRAINING PROGRAM

## 2024-09-30 PROCEDURE — 63600000 HC DRUGS/DETAIL CODE: Mod: JZ,JG | Performed by: SURGERY

## 2024-09-30 PROCEDURE — 63600000 HC DRUGS/DETAIL CODE: Mod: JZ | Performed by: NURSE ANESTHETIST, CERTIFIED REGISTERED

## 2024-09-30 PROCEDURE — 36000016 HC OR LEVEL 6 EA ADDL MIN: Performed by: SURGERY

## 2024-09-30 DEVICE — IMPLANTABLE DEVICE: Type: IMPLANTABLE DEVICE | Site: ABDOMEN | Status: FUNCTIONAL

## 2024-09-30 RX ORDER — BUPIVACAINE HYDROCHLORIDE 2.5 MG/ML
INJECTION, SOLUTION EPIDURAL; INFILTRATION; INTRACAUDAL
Status: DISCONTINUED | OUTPATIENT
Start: 2024-09-30 | End: 2024-09-30 | Stop reason: HOSPADM

## 2024-09-30 RX ORDER — MIDAZOLAM HYDROCHLORIDE 2 MG/2ML
INJECTION, SOLUTION INTRAMUSCULAR; INTRAVENOUS AS NEEDED
Status: DISCONTINUED | OUTPATIENT
Start: 2024-09-30 | End: 2024-09-30 | Stop reason: SURG

## 2024-09-30 RX ORDER — LABETALOL HYDROCHLORIDE 5 MG/ML
5 INJECTION, SOLUTION INTRAVENOUS AS NEEDED
Status: DISCONTINUED | OUTPATIENT
Start: 2024-09-30 | End: 2024-09-30 | Stop reason: HOSPADM

## 2024-09-30 RX ORDER — HYDROMORPHONE HYDROCHLORIDE 1 MG/ML
INJECTION, SOLUTION INTRAMUSCULAR; INTRAVENOUS; SUBCUTANEOUS
Status: COMPLETED
Start: 2024-09-30 | End: 2024-09-30

## 2024-09-30 RX ORDER — OXYCODONE HYDROCHLORIDE 5 MG/1
TABLET ORAL
Status: DISCONTINUED
Start: 2024-09-30 | End: 2024-09-30 | Stop reason: HOSPADM

## 2024-09-30 RX ORDER — SODIUM CHLORIDE, SODIUM LACTATE, POTASSIUM CHLORIDE, CALCIUM CHLORIDE 600; 310; 30; 20 MG/100ML; MG/100ML; MG/100ML; MG/100ML
INJECTION, SOLUTION INTRAVENOUS CONTINUOUS
Status: DISCONTINUED | OUTPATIENT
Start: 2024-09-30 | End: 2024-09-30 | Stop reason: HOSPADM

## 2024-09-30 RX ORDER — ONDANSETRON HYDROCHLORIDE 2 MG/ML
4 INJECTION, SOLUTION INTRAVENOUS
Status: DISCONTINUED | OUTPATIENT
Start: 2024-09-30 | End: 2024-09-30 | Stop reason: HOSPADM

## 2024-09-30 RX ORDER — ATROPINE SULFATE 0.4 MG/ML
0.2 INJECTION, SOLUTION ENDOTRACHEAL; INTRAMEDULLARY; INTRAMUSCULAR; INTRAVENOUS; SUBCUTANEOUS EVERY 5 MIN PRN
Status: DISCONTINUED | OUTPATIENT
Start: 2024-09-30 | End: 2024-09-30 | Stop reason: HOSPADM

## 2024-09-30 RX ORDER — HYDRALAZINE HYDROCHLORIDE 20 MG/ML
5 INJECTION INTRAMUSCULAR; INTRAVENOUS
Status: DISCONTINUED | OUTPATIENT
Start: 2024-09-30 | End: 2024-09-30 | Stop reason: HOSPADM

## 2024-09-30 RX ORDER — DEXAMETHASONE SODIUM PHOSPHATE 4 MG/ML
INJECTION, SOLUTION INTRA-ARTICULAR; INTRALESIONAL; INTRAMUSCULAR; INTRAVENOUS; SOFT TISSUE AS NEEDED
Status: DISCONTINUED | OUTPATIENT
Start: 2024-09-30 | End: 2024-09-30 | Stop reason: SURG

## 2024-09-30 RX ORDER — OXYCODONE HYDROCHLORIDE 5 MG/1
5 TABLET ORAL ONCE AS NEEDED
Status: COMPLETED | OUTPATIENT
Start: 2024-09-30 | End: 2024-09-30

## 2024-09-30 RX ORDER — ROCURONIUM BROMIDE 10 MG/ML
INJECTION, SOLUTION INTRAVENOUS AS NEEDED
Status: DISCONTINUED | OUTPATIENT
Start: 2024-09-30 | End: 2024-09-30 | Stop reason: SURG

## 2024-09-30 RX ORDER — MEPERIDINE HYDROCHLORIDE 25 MG/ML
12.5 INJECTION INTRAMUSCULAR; INTRAVENOUS; SUBCUTANEOUS EVERY 10 MIN PRN
Status: DISCONTINUED | OUTPATIENT
Start: 2024-09-30 | End: 2024-09-30 | Stop reason: HOSPADM

## 2024-09-30 RX ORDER — FENTANYL CITRATE 50 UG/ML
INJECTION, SOLUTION INTRAMUSCULAR; INTRAVENOUS
Status: COMPLETED
Start: 2024-09-30 | End: 2024-09-30

## 2024-09-30 RX ORDER — OXYCODONE AND ACETAMINOPHEN 5; 325 MG/1; MG/1
1 TABLET ORAL EVERY 4 HOURS PRN
Qty: 12 TABLET | Refills: 0 | Status: SHIPPED | OUTPATIENT
Start: 2024-09-30 | End: 2024-10-05

## 2024-09-30 RX ORDER — HYDROMORPHONE HYDROCHLORIDE 1 MG/ML
0.5 INJECTION, SOLUTION INTRAMUSCULAR; INTRAVENOUS; SUBCUTANEOUS
Status: DISCONTINUED | OUTPATIENT
Start: 2024-09-30 | End: 2024-09-30 | Stop reason: HOSPADM

## 2024-09-30 RX ORDER — FENTANYL CITRATE 50 UG/ML
50 INJECTION, SOLUTION INTRAMUSCULAR; INTRAVENOUS EVERY 5 MIN PRN
Status: DISCONTINUED | OUTPATIENT
Start: 2024-09-30 | End: 2024-09-30 | Stop reason: HOSPADM

## 2024-09-30 RX ORDER — ONDANSETRON HYDROCHLORIDE 2 MG/ML
INJECTION, SOLUTION INTRAVENOUS AS NEEDED
Status: DISCONTINUED | OUTPATIENT
Start: 2024-09-30 | End: 2024-09-30 | Stop reason: SURG

## 2024-09-30 RX ORDER — HYDROMORPHONE HYDROCHLORIDE 1 MG/ML
INJECTION, SOLUTION INTRAMUSCULAR; INTRAVENOUS; SUBCUTANEOUS
Status: DISCONTINUED
Start: 2024-09-30 | End: 2024-09-30 | Stop reason: HOSPADM

## 2024-09-30 RX ORDER — LIDOCAINE HCL/PF 100 MG/5ML
SYRINGE (ML) INTRAVENOUS AS NEEDED
Status: DISCONTINUED | OUTPATIENT
Start: 2024-09-30 | End: 2024-09-30 | Stop reason: SURG

## 2024-09-30 RX ORDER — PROPOFOL 10 MG/ML
INJECTION, EMULSION INTRAVENOUS AS NEEDED
Status: DISCONTINUED | OUTPATIENT
Start: 2024-09-30 | End: 2024-09-30 | Stop reason: SURG

## 2024-09-30 RX ORDER — FENTANYL CITRATE 50 UG/ML
INJECTION, SOLUTION INTRAMUSCULAR; INTRAVENOUS AS NEEDED
Status: DISCONTINUED | OUTPATIENT
Start: 2024-09-30 | End: 2024-09-30 | Stop reason: SURG

## 2024-09-30 RX ORDER — MIDAZOLAM HYDROCHLORIDE 2 MG/2ML
1 INJECTION, SOLUTION INTRAMUSCULAR; INTRAVENOUS ONCE AS NEEDED
Status: DISCONTINUED | OUTPATIENT
Start: 2024-09-30 | End: 2024-09-30 | Stop reason: HOSPADM

## 2024-09-30 RX ADMIN — FENTANYL CITRATE 100 MCG: 50 INJECTION, SOLUTION INTRAMUSCULAR; INTRAVENOUS at 12:35

## 2024-09-30 RX ADMIN — FENTANYL CITRATE 50 MCG: 50 INJECTION, SOLUTION INTRAMUSCULAR; INTRAVENOUS at 14:15

## 2024-09-30 RX ADMIN — SUGAMMADEX 167.8 MG: 100 INJECTION, SOLUTION INTRAVENOUS at 13:54

## 2024-09-30 RX ADMIN — FENTANYL CITRATE 50 MCG: 50 INJECTION, SOLUTION INTRAMUSCULAR; INTRAVENOUS at 14:20

## 2024-09-30 RX ADMIN — DEXAMETHASONE SODIUM PHOSPHATE 4 MG: 4 INJECTION, SOLUTION INTRAMUSCULAR; INTRAVENOUS at 12:49

## 2024-09-30 RX ADMIN — OXYCODONE HYDROCHLORIDE 5 MG: 5 TABLET ORAL at 16:01

## 2024-09-30 RX ADMIN — PROPOFOL 200 MG: 10 INJECTION, EMULSION INTRAVENOUS at 12:36

## 2024-09-30 RX ADMIN — CEFAZOLIN 2 G: 2 INJECTION, POWDER, FOR SOLUTION INTRAMUSCULAR; INTRAVENOUS at 12:29

## 2024-09-30 RX ADMIN — MIDAZOLAM HYDROCHLORIDE 2 MG: 1 INJECTION, SOLUTION INTRAMUSCULAR; INTRAVENOUS at 12:29

## 2024-09-30 RX ADMIN — LIDOCAINE HYDROCHLORIDE 100 MG: 20 INJECTION, SOLUTION INTRAVENOUS at 12:36

## 2024-09-30 RX ADMIN — FENTANYL CITRATE 50 MCG: 50 INJECTION, SOLUTION INTRAMUSCULAR; INTRAVENOUS at 12:49

## 2024-09-30 RX ADMIN — SODIUM CHLORIDE, POTASSIUM CHLORIDE, SODIUM LACTATE AND CALCIUM CHLORIDE: 600; 310; 30; 20 INJECTION, SOLUTION INTRAVENOUS at 12:10

## 2024-09-30 RX ADMIN — ONDANSETRON HYDROCHLORIDE 4 MG: 2 INJECTION, SOLUTION INTRAMUSCULAR; INTRAVENOUS at 13:48

## 2024-09-30 RX ADMIN — ROCURONIUM BROMIDE 50 MG: 10 INJECTION, SOLUTION INTRAVENOUS at 12:37

## 2024-09-30 RX ADMIN — HYDROMORPHONE HYDROCHLORIDE 0.5 MG: 1 INJECTION, SOLUTION INTRAMUSCULAR; INTRAVENOUS; SUBCUTANEOUS at 14:42

## 2024-09-30 RX ADMIN — FENTANYL CITRATE 100 MCG: 50 INJECTION, SOLUTION INTRAMUSCULAR; INTRAVENOUS at 14:05

## 2024-09-30 RX ADMIN — HYDROMORPHONE HYDROCHLORIDE 0.5 MG: 1 INJECTION, SOLUTION INTRAMUSCULAR; INTRAVENOUS; SUBCUTANEOUS at 14:25

## 2024-09-30 RX ADMIN — ROCURONIUM BROMIDE 20 MG: 10 INJECTION, SOLUTION INTRAVENOUS at 12:55

## 2024-09-30 NOTE — OP NOTE
Robotic initial, non recurrent, incarcerated initial ventral hernia repair with mesh 2 x 2 cm      PreOP Dx: Pre-op Diagnosis      * Incarcerated Ventral hernia without obstruction or gangrene    PostOp Dx: Post-op Diagnosis     * Incarcerated Ventral hernia without obstruction or gangrene     Procedure: Procedure(s):  INCARCARTED HERNIA VENTRAL/INCISIONAL/SPIGELIAN LAPAROSCOPIC ROBOTIC - Wound Class: Clean - Incision Closure: Deep and Superficial Layers    Surgeon: Surgeons and Role:     * Sheldon Patten MD - Primary    Assistant: Asya     Anesthesia: General    EBL: Minimal    Drains: none    Specimen:   ID Type Source Tests Collected by Time Destination   1 : Incarerated Umbilical Hernia Contents Tissue Abdomen (specify site) PATHOLOGY TISSUE EXAM Sheldon Patten MD 9/30/2024 1314        Complications: None    To PACU in satisfactory condition.      Procedure Description:    The patient was correctly identified and brought to the operating room, placed in the supine position with all pressure points well padded. A surgical time-out was performed. Pre-operative antibiotics were administered. After induction of general anesthesia the patient's abdomen was exposed. A phelan catheter was placed. The patient had ample surgical padding and support placed on all pressure points, extremities, and the trunk. Careful attention was directed to ensure that the patient was comfortably supported at all pressure points. The patient was then prepped and draped in the usual sterile fashion.      The procedure commenced by making an 8mm incision in the left upper quadrant two fingerbreadths beneath the left costal margin at approximately the midclavicular line. A Veress needle was placed through this incision and was used to access the peritoneal cavity. The saline drop test confirmed entry into the peritoneal cavity, and the abdomen was then insufflated to 15mm Hg of pneumoperitoneum. The  Veress needle was removed and an 8mm robotic optical trocar system was used to enter the peritoneal cavity in the same location, carefully visualizing each layer of the abdominal wall during entry. A robotic camera was then placed through this port. Inspection of the area immediately underneath and around the port did not reveal injury to abdominal contents from the Veress needle or port placement.    Next, under direct vision an 8mm trocar was placed in the very lateral left abdominal wall and mid abdomen laterally. The [employer] robot was brought in and docked w no issues. Next, attention was directed to performing a through survey of the entire abdomen. The hernia was visualized. It was a ventral incarcerated hernia containing incarcerated preperitoneal fat. The hernia contents were excised as were not able to be reduced completely due to the incarceration.  We had excellent hemostasis. The hernia defect measured 2 x 2 cm in size. Based on measurements taken and the appearance of the abdominal wall anatomy, it was felt that the appropriate size mesh was an 10 mesh.    The hernia defect was closed w v lock suture. It was indeed positioned nicely w excellent overlap of at least 4-5 cm around the hernia defect. We ensured excellent hemostasis and the needles were removed in the end. The counts were correct and the Darnell was removed at the end of the procedure.    The patient tolerated the procedure well with no complications and was extubated and transported to the PACU in stable condition.    Sheldon Patten MD, FACS  General and Endocrine Surgery  [facility]

## 2024-09-30 NOTE — ANESTHESIOLOGIST PRE-PROCEDURE ATTESTATION
Pre-Procedure Patient Identification:  I am the Primary Anesthesiologist and have identified the patient on 09/30/24 at 11:54 AM.   I have confirmed the procedure(s) will be performed by the following surgeon/proceduralist Sheldon Patten MD.

## 2024-09-30 NOTE — OR SURGEON
Pre-Procedure patient identification:  I am the primary operating surgeon/proceduralist and I have reviewed the applicable pathology reports and radiology studies for this procedure. I have identified the patient on 09/30/24 at 11:40 AM Sheldon Patten MD  Phone Number: [phone]

## 2024-09-30 NOTE — ANESTHESIA PROCEDURE NOTES
Airway  Urgency: elective    Start Time: 9/30/2024 12:38 PM  Airway not difficult    General Information and Staff    Patient location during procedure: OR  Anesthesiologist: Amber Goss DO  Resident/CRNA: Yohannes Arana CRNA  Performed: resident/CRNA   Performed by: Yohannes Arana CRNA  Authorized by: Amber Goss DO      Indications and Patient Condition  Indications for airway management: anesthesia  Sedation level: general  Preoxygenated: yes  Patient position: sniffing  Mask difficulty assessment: 1 - vent by mask    Final Airway Details  Final airway type: endotracheal airway      Successful airway: ETT  Cuffed: yes   Successful intubation technique: direct laryngoscopy  Facilitating devices/methods: intubating stylet  Endotracheal tube insertion site: oral  Blade: Senait  Blade size: #3  ETT size (mm): 7.0  Cormack-Lehane Classification: grade IIa - partial view of glottis  Placement verified by: chest auscultation and capnometry   Measured from: teeth  ETT to teeth (cm): 22  Number of attempts at approach: 1  Atraumatic airway insertion

## 2024-09-30 NOTE — INTERVAL H&P NOTE
H&P reviewed. The patient was examined and there are no changes to the H&P.    Incarcerated ventral for robotic repair with mesh

## 2024-09-30 NOTE — DISCHARGE INSTRUCTIONS
Call the office to make an appointment to see your surgeon 2 weeks from your discharge or if having any problems.    You can restart all your previous home medications, if any.    TALK TO YOUR SURGEON IF YOU ARE TAKING BLOOD THINNERS PRIOR TO RESTARTING THEM.      Wound care:  You can shower today.  The glue will peel off over time  You can expect mild bloody discharge from the wounds, it is normal  Do not soak wounds in a bath-tub    VERY IMPORTANT: With a dry washcloth pad dry the wounds, especially the belly-button to remove all moisture.    Signs or symptoms to watch out for:  Fever >101  Persistent Nausea/Vomiting which won't go away  Drainage of blood or pus from incisions  Warmth, redness or increasing tenderness around incisions  Increasing abdominal pain  Prevent getting constipated by taking Colace 100mg BID, to ensure at least 1 soft bowel movement a day, narcotic medications you are on can be constipating. You may take any other stool softener that is over the counter instead as well.    DIET: Drink plenty of fluids and eat foods high in fiber while taking prescription pain medication, as it may be constipating.      ACTIVITY RESTRICTIONS:    DRIVING: No driving for at least 1-2 weeks and while on prescription pain medication.  STAIRS: No restrictions.  WALKING: No restrictions.  LIFTING: Nothing more than a gallon of milk (10lbs).   SEXUAL: No restrictions.  BATHING: Showers okay. No baths or swimming.  WORK/SCHOOL: No work/school for 2 weeks. May return to light duty after recovery.    Sheldon Patten MD, FACS  General and Endocrine Surgery  [facility]

## 2024-09-30 NOTE — ANESTHESIA PREPROCEDURE EVALUATION
Relevant Problems   No relevant active problems       ROS/Med Hx     Past Surgical History   Procedure Laterality Date   • Rhinoplasty     • [place] tooth extraction         Physical Exam    Airway   Mallampati: II   TM distance: >3 FB   Neck ROM: full  Cardiovascular - normal   Rhythm: regular   Rate: normalPulmonary - normal   clear to auscultation  Dental - normal      Anesthesia Plan    Plan: general    Technique: general endotracheal     Lines and Monitors: PIV     Airway: oral intubation    2 ASA  Blood Products:   Use of Blood Products Discussed: No   Anesthetic plan and risks discussed with: patient  Induction:    intravenous   Postop Plan:   Patient Disposition: inpatient floor planned admission   Pain Management: IV analgesics

## 2024-10-01 LAB
CASE RPRT: NORMAL
CLINICAL INFO: NORMAL
PATH REPORT.FINAL DX SPEC: NORMAL
PATH REPORT.GROSS SPEC: NORMAL

## 2024-10-01 NOTE — ANESTHESIA POSTPROCEDURE EVALUATION
Patient: Kymberly Grajeda    Procedure Summary       Date: 09/30/24 Room / Location: [facility] OR 10 / [facility]    Anesthesia Start: 1230 Anesthesia Stop: 1414    Procedure: HERNIA VENTRAL/INCISIONAL/SPIGELIAN LAPAROSCOPIC ROBOTIC (Abdomen) Diagnosis:       Ventral hernia without obstruction or gangrene      (Ventral hernia without obstruction or gangrene [K43.9])    Surgeons: Sheldon Patten MD Responsible Provider: Amber Goss DO    Anesthesia Type: general ASA Status: 2            Anesthesia Type: general  PACU Vitals  9/30/2024 1414 - 9/30/2024 1514        9/30/2024  1415 9/30/2024  1420 9/30/2024  1425 9/30/2024  1430    BP: -- 141/63 -- 137/64    Pulse: 75 70 69 66    Resp: 16 23 25 24    SpO2: -- -- -- --                9/30/2024  1440 9/30/2024  1442 9/30/2024  1450 9/30/2024  1500    BP: 136/55 136/55 133/63 127/66    Pulse: 64 63 57 59    Resp: 35 33 16 13    SpO2: -- -- -- 90 %                9/30/2024  1510             BP: 120/68       Pulse: 59       Resp: 11       SpO2: 93 %                 Anesthesia Post Evaluation    Pain management: adequate  Mode of pain management: IV medication  Patient location during evaluation: PACU  Patient participation: complete - patient participated  Level of consciousness: awake and alert  Cardiovascular status: acceptable  Airway Patency: adequate  Respiratory status: acceptable  Hydration status: acceptable  Anesthetic complications: no

## 2024-10-17 ENCOUNTER — OFFICE VISIT (OUTPATIENT)
Dept: SURGERY | Facility: CLINIC | Age: 44
End: 2024-10-17
Payer: COMMERCIAL

## 2024-10-17 VITALS
HEART RATE: 77 BPM | OXYGEN SATURATION: 97 % | BODY MASS INDEX: 28.97 KG/M2 | DIASTOLIC BLOOD PRESSURE: 76 MMHG | HEIGHT: 67 IN | TEMPERATURE: 98.3 F | SYSTOLIC BLOOD PRESSURE: 118 MMHG | WEIGHT: 184.6 LBS

## 2024-10-17 DIAGNOSIS — Z09 POSTOP CHECK: Primary | ICD-10-CM

## 2024-10-17 PROCEDURE — 99024 POSTOP FOLLOW-UP VISIT: CPT | Performed by: SURGERY

## 2024-10-17 NOTE — PROGRESS NOTES
"  Patient ID: Kymberly Grajeda                              : 1980  MRN: [id number]                                            Visit Date: 10/17/2024  Encounter Provider: Sheldon Patten  Referring Provider: Lamine Bass DO    Subjective   Chief Complaint: Follow-up    Kymberly Grajeda is a 44 y.o. old female presenting today for postop check, Denies abd pain, fevers chills, no complaints, feels great.      Medications:   Current Outpatient Medications:     buPROPion XL (WELLBUTRIN XL) 300 mg 24 hr tablet, Take 1 tablet (300 mg total) by mouth daily. (Patient taking differently: Take 300 mg by mouth every morning.), Disp: 90 tablet, Rfl: 2    fexofenadine (ALLEGRA) 180 mg tablet, Take 180 mg by mouth every morning., Disp: , Rfl:     FLUoxetine (PROzac) 20 mg capsule, Take 1 capsule (20 mg total) by mouth daily. (Patient taking differently: Take 20 mg by mouth every morning.), Disp: 90 capsule, Rfl: 2    furosemide (LASIX) 40 mg tablet, Take 40 mg by mouth every morning., Disp: , Rfl:     gabapentin (NEURONTIN) 600 mg tablet, Take 600 mg by mouth as needed., Disp: , Rfl:     Past Medical History:  has a past medical history of ADD (attention deficit disorder), Anxiety, and Depression.    Objective   Vitals: Visit Vitals  /76   Pulse 77   Temp 36.8 °C (98.3 °F)   Ht 1.702 m (5' 7\")   Wt 83.7 kg (184 lb 9.6 oz)   SpO2 97%   BMI 28.91 kg/m²     Physical Exam     Incision(s) healing well no erythema or drainage no ttp or swelling    Assessment/Plan   Problem List Items Addressed This Visit    None      Discussed intra-op findings, path report and postop care w pt - all questions answered      Component    Clinical Information    Pre-op diagnosis:  Ventral hernia without obstruction or gangrene [K43.9]   Final Diagnosis   Incarcerated umbilical hernia contents:               Fibroadipose tissue.     Patent is very appreciative    No follow-ups on file.       Sheldon Patten, " MD

## 2024-10-18 ENCOUNTER — TELEPHONE (OUTPATIENT)
Dept: SURGERY | Facility: CLINIC | Age: 44
End: 2024-10-18
Payer: COMMERCIAL

## 2024-10-18 DIAGNOSIS — Z09 POSTOP CHECK: Primary | ICD-10-CM

## 2024-10-18 NOTE — TELEPHONE ENCOUNTER
Pls let or know this can be normal after this kind of procedure but in any case I ordered a Ct scan if the pain persistent or develops a bulge fever vomiting the office can assist w scheduling this thank you

## (undated) DEVICE — SUTURE VLOC 2-0 90 VIOLET 1X9 GS-21

## (undated) DEVICE — SUTURE BIOSYN 4-0 UNDYED 1X18 P-14

## (undated) DEVICE — PACK UNIVERSAL ULTRAGARD 5/CS

## (undated) DEVICE — SHEET DRAPE 40X58 STERILE

## (undated) DEVICE — SUTURE VLOC 2-0 90 VIOLET 1X12 GS-21

## (undated) DEVICE — APPLICATOR CHLORAPREP 26ML ORANGE TINT

## (undated) DEVICE — NEEDLE INSUFFLATION 120MM

## (undated) DEVICE — MANIFOLD FOUR PORT NEPTUNE

## (undated) DEVICE — HANDLE LIGHT COVER STERILE

## (undated) DEVICE — DRAPE ARM DAVINCI XI

## (undated) DEVICE — OBTURATOR BLADELESS 8MM XI

## (undated) DEVICE — SEAL UNIVERSAL 5MM-8MM XI

## (undated) DEVICE — SYSTEM VISUALIZATION CLEARIFY

## (undated) DEVICE — TUBING PLUM PORT ACTIVE SMOKE EVAC

## (undated) DEVICE — PAD GROUND ELECTROSURGICAL W/CORD

## (undated) DEVICE — TIP BOVIE BLADE COATED INSULATED 2.75IN

## (undated) DEVICE — PAD POSITIONING XL

## (undated) DEVICE — SET UP PACK RH

## (undated) DEVICE — DRESSING SPONGE ALL GAUZE 4X4 STER 10PK

## (undated) DEVICE — GOWN SIRUS NONRNF RAGLAN XL ST 30/CS

## (undated) DEVICE — DRESSING TEGADERM 4X4 3/4

## (undated) DEVICE — CAMERA LIGHT COVER STERILE HARMONY-AIR

## (undated) DEVICE — TUBE INSUFFLATION OPEN CONNECTION

## (undated) DEVICE — SYRINGE 10CC CONTROL LL

## (undated) DEVICE — NEEDLE DISP HYPO 25GX1-1/2IN

## (undated) DEVICE — PENEVAC1 NONSTICK SMOKE EVAC

## (undated) DEVICE — GLOVE SZ 7.5 LINER PROTEXIS PI BL

## (undated) DEVICE — SHEARS TIP COVER DAVINCI ONE USE

## (undated) DEVICE — SOLN IRRIG .9%SOD 1000ML

## (undated) DEVICE — SYRINGE DISP LUER-LOK 10 CC

## (undated) DEVICE — ADHESIVE SKIN DERMABOND ADVANCED 0.7ML